# Patient Record
Sex: FEMALE | Race: WHITE | URBAN - METROPOLITAN AREA
[De-identification: names, ages, dates, MRNs, and addresses within clinical notes are randomized per-mention and may not be internally consistent; named-entity substitution may affect disease eponyms.]

---

## 2020-12-18 ENCOUNTER — NEW PATIENT (OUTPATIENT)
Dept: URBAN - METROPOLITAN AREA CLINIC 51 | Facility: CLINIC | Age: 70
End: 2020-12-18
Payer: COMMERCIAL

## 2020-12-18 DIAGNOSIS — H40.013 OPEN ANGLE WITH BORDERLINE FINDINGS, LOW RISK, BILATERAL: Primary | ICD-10-CM

## 2020-12-18 DIAGNOSIS — H04.123 TEAR FILM INSUFFICIENCY OF BILATERAL LACRIMAL GLANDS: ICD-10-CM

## 2020-12-18 PROCEDURE — 92134 CPTRZ OPH DX IMG PST SGM RTA: CPT | Performed by: OPHTHALMOLOGY

## 2020-12-18 PROCEDURE — 92250 FUNDUS PHOTOGRAPHY W/I&R: CPT | Performed by: OPHTHALMOLOGY

## 2020-12-18 PROCEDURE — 92004 COMPRE OPH EXAM NEW PT 1/>: CPT | Performed by: OPHTHALMOLOGY

## 2020-12-18 ASSESSMENT — VISUAL ACUITY
OD: 20/125
OS: 20/30

## 2020-12-18 ASSESSMENT — KERATOMETRY
OD: 44.75
OS: 44.84

## 2020-12-18 ASSESSMENT — INTRAOCULAR PRESSURE
OS: 22
OD: 22

## 2021-02-11 ENCOUNTER — FOLLOW UP ESTABLISHED (OUTPATIENT)
Dept: URBAN - METROPOLITAN AREA CLINIC 44 | Facility: CLINIC | Age: 71
End: 2021-02-11
Payer: COMMERCIAL

## 2021-02-11 DIAGNOSIS — H25.13 AGE-RELATED NUCLEAR CATARACT, BILATERAL: ICD-10-CM

## 2021-02-11 DIAGNOSIS — H10.45 OTHER CHRONIC ALLERGIC CONJUNCTIVITIS: ICD-10-CM

## 2021-02-11 DIAGNOSIS — H43.393 OTHER VITREOUS OPACITIES, BILATERAL: ICD-10-CM

## 2021-02-11 DIAGNOSIS — H40.051 OCULAR HYPERTENSION, RIGHT EYE: ICD-10-CM

## 2021-02-11 PROCEDURE — 99213 OFFICE O/P EST LOW 20 MIN: CPT | Performed by: OPHTHALMOLOGY

## 2021-02-11 PROCEDURE — 76514 ECHO EXAM OF EYE THICKNESS: CPT | Performed by: OPHTHALMOLOGY

## 2021-02-11 PROCEDURE — 92083 EXTENDED VISUAL FIELD XM: CPT | Performed by: OPHTHALMOLOGY

## 2021-02-11 PROCEDURE — 92020 GONIOSCOPY: CPT | Performed by: OPHTHALMOLOGY

## 2021-02-11 PROCEDURE — 92133 CPTRZD OPH DX IMG PST SGM ON: CPT | Performed by: OPHTHALMOLOGY

## 2021-02-11 ASSESSMENT — INTRAOCULAR PRESSURE
OS: 21
OD: 24

## 2021-02-11 ASSESSMENT — VISUAL ACUITY
OS: 20/30
OD: 20/125

## 2021-05-24 ENCOUNTER — OFFICE VISIT (OUTPATIENT)
Dept: URBAN - METROPOLITAN AREA CLINIC 44 | Facility: CLINIC | Age: 71
End: 2021-05-24
Payer: COMMERCIAL

## 2021-05-24 DIAGNOSIS — H40.053 OCULAR HYPERTENSION, BILATERAL: ICD-10-CM

## 2021-05-24 PROCEDURE — 99213 OFFICE O/P EST LOW 20 MIN: CPT | Performed by: OPHTHALMOLOGY

## 2021-05-24 ASSESSMENT — INTRAOCULAR PRESSURE
OS: 24
OD: 24

## 2021-05-24 NOTE — IMPRESSION/PLAN
Impression: Age-related nuclear cataract, bilateral Plan: Review H40.051. Patient may schedule CE/Phaco/IOL/KDB OU Patient is candidate/interested in toric lens/astigmatism correction, standard lens, ORA. Aim OD: -0.25. Aim OS: -0.25. No LenSx/MF/Trimoxi. 
See [address] to Schedule to Sx OD Then OS; H&P, AScan, Dilated Pre-Op 1st Eye; POD1; POW1; 2nd Eye; POD1; POW1; JRM9 [[facility] 1WPOs with Dr Tonia Antonio, 1DPO and 1MPO with Optometry]
- ***Clearance for Cardio Myopathy

## 2021-08-12 ENCOUNTER — OFFICE VISIT (OUTPATIENT)
Dept: URBAN - METROPOLITAN AREA CLINIC 44 | Facility: CLINIC | Age: 71
End: 2021-08-12
Payer: COMMERCIAL

## 2021-08-12 DIAGNOSIS — H25.813 COMBINED FORMS OF AGE-RELATED CATARACT, BILATERAL: ICD-10-CM

## 2021-08-12 DIAGNOSIS — H43.813 VITREOUS DEGENERATION, BILATERAL: ICD-10-CM

## 2021-08-12 DIAGNOSIS — H40.023 OPEN ANGLE WITH BORDERLINE FINDINGS, HIGH RISK, BILATERAL: ICD-10-CM

## 2021-08-12 PROCEDURE — 99214 OFFICE O/P EST MOD 30 MIN: CPT | Performed by: OPHTHALMOLOGY

## 2021-08-12 PROCEDURE — 92083 EXTENDED VISUAL FIELD XM: CPT | Performed by: OPHTHALMOLOGY

## 2021-08-12 ASSESSMENT — INTRAOCULAR PRESSURE
OD: 22
OS: 21

## 2021-08-12 ASSESSMENT — VISUAL ACUITY
OD: 20/60
OS: 20/30

## 2021-08-12 NOTE — IMPRESSION/PLAN
Impression: Vitreous degeneration, bilateral: H43.813. Plan: Monitor. Patient to call/come in with and RD symptoms.

## 2021-08-12 NOTE — IMPRESSION/PLAN
Impression: Combined forms of age-related cataract, bilateral: H25.813. Plan: Discussed cataracts with patient. Surgical treatment is recommended. Surgical options, risks and benefits discussed. Patient elects surgical treatment. Recommend surgery OU, OD first.  Patient is candidate/interested in toric lens/astigmatism correction, standard lens, ORA. Aim OD: -0.25. Aim OS: -0.25. No LenSx/MF/Trimoxi. See [facility] to Schedule CE/Phaco/IOL/KDB OD Then OS; H&P, AScan, UN-Dilated Pre-Op 1st Eye; POD1; POW1; 2nd Eye; POD1; POW1; RWK7 [[facility] 1WPOs with Dr Gume Omer, 1DPO and 1MPO with Optometry] ***Clearance- cardiology/aspirin***

## 2021-08-12 NOTE — IMPRESSION/PLAN
Impression: Open angle with borderline findings, high risk, bilateral: H40.023. Plan: Monitor.  See H40.056

## 2021-09-03 ENCOUNTER — TESTING ONLY (OUTPATIENT)
Dept: URBAN - METROPOLITAN AREA CLINIC 44 | Facility: CLINIC | Age: 71
End: 2021-09-03
Payer: COMMERCIAL

## 2021-09-03 PROCEDURE — 92136 OPHTHALMIC BIOMETRY: CPT | Performed by: OPHTHALMOLOGY

## 2021-09-03 PROCEDURE — 92025 CPTRIZED CORNEAL TOPOGRAPHY: CPT | Performed by: OPHTHALMOLOGY

## 2021-09-03 ASSESSMENT — PACHYMETRY
OS: 2.89
OD: 22.93
OS: 22.70
OD: 3.04

## 2021-09-10 ENCOUNTER — PRE-OPERATIVE VISIT (OUTPATIENT)
Dept: URBAN - METROPOLITAN AREA CLINIC 51 | Facility: CLINIC | Age: 71
End: 2021-09-10
Payer: COMMERCIAL

## 2021-09-10 DIAGNOSIS — H52.223 REGULAR ASTIGMATISM, BILATERAL: ICD-10-CM

## 2021-09-10 DIAGNOSIS — H25.812 COMBINED FORMS OF AGE-RELATED CATARACT, LEFT EYE: ICD-10-CM

## 2021-09-10 DIAGNOSIS — H25.811 COMBINED FORMS OF AGE-RELATED CATARACT, RIGHT EYE: ICD-10-CM

## 2021-09-10 PROCEDURE — 99213 OFFICE O/P EST LOW 20 MIN: CPT | Performed by: OPHTHALMOLOGY

## 2021-09-10 RX ORDER — OFLOXACIN 3 MG/ML
0.3 % SOLUTION/ DROPS OPHTHALMIC
Qty: 5 | Refills: 1 | Status: ACTIVE
Start: 2021-09-10

## 2021-09-10 RX ORDER — KETOROLAC TROMETHAMINE 5 MG/ML
0.5 % SOLUTION OPHTHALMIC
Qty: 10 | Refills: 1 | Status: ACTIVE
Start: 2021-09-10

## 2021-09-10 RX ORDER — PREDNISOLONE ACETATE 10 MG/ML
1 % SUSPENSION/ DROPS OPHTHALMIC
Qty: 10 | Refills: 1 | Status: ACTIVE
Start: 2021-09-10

## 2021-09-10 ASSESSMENT — INTRAOCULAR PRESSURE
OD: 24
OS: 24

## 2021-09-10 NOTE — IMPRESSION/PLAN
Impression: Tear film insufficiency of bilateral lacrimal glands Plan: Patient understands condition may limit possible outcome of surgery.

## 2021-09-10 NOTE — IMPRESSION/PLAN
Impression: Vitreous degeneration, bilateral: H43.813. Plan: Monitor. Patient understands condition may limit possible outcome of surgery.

## 2021-09-10 NOTE — IMPRESSION/PLAN
Impression: Other chronic allergic conjunctivitis ; OU Plan: Continue allergy drop. Patient understands condition may limit possible outcome of surgery.

## 2021-09-10 NOTE — IMPRESSION/PLAN
Impression: Open angle with borderline findings, high risk, bilateral: H40.023. Plan: Monitor. See H40.053 Patient understands condition may limit possible outcome of surgery.

## 2021-09-10 NOTE — IMPRESSION/PLAN
Impression: Ocular hypertension, bilateral: H40.053. Plan: Recommend to proceed with Phaco/KDB OU. Patient understands condition may limit possible outcome of surgery.

## 2021-09-17 ENCOUNTER — ADULT PHYSICAL (OUTPATIENT)
Dept: URBAN - METROPOLITAN AREA CLINIC 44 | Facility: CLINIC | Age: 71
End: 2021-09-17
Payer: COMMERCIAL

## 2021-09-17 DIAGNOSIS — Z01.818 ENCOUNTER FOR OTHER PREPROCEDURAL EXAMINATION: Primary | ICD-10-CM

## 2021-09-17 PROCEDURE — 99203 OFFICE O/P NEW LOW 30 MIN: CPT | Performed by: PHYSICIAN ASSISTANT

## 2021-09-17 RX ORDER — FUROSEMIDE 40 MG/1
40 MG TABLET ORAL
Qty: 0 | Refills: 0 | Status: ACTIVE
Start: 2021-09-17

## 2021-09-17 RX ORDER — SPIRONOLACTONE 50 MG/1
50 MG TABLET, FILM COATED ORAL
Qty: 0 | Refills: 0 | Status: ACTIVE
Start: 2021-09-17

## 2021-09-23 ENCOUNTER — SURGERY (OUTPATIENT)
Dept: URBAN - METROPOLITAN AREA SURGERY 19 | Facility: SURGERY | Age: 71
End: 2021-09-23
Payer: COMMERCIAL

## 2021-09-24 ENCOUNTER — POST-OPERATIVE VISIT (OUTPATIENT)
Dept: URBAN - METROPOLITAN AREA CLINIC 44 | Facility: CLINIC | Age: 71
End: 2021-09-24

## 2021-09-24 PROCEDURE — 99024 POSTOP FOLLOW-UP VISIT: CPT | Performed by: OPTOMETRIST

## 2021-09-24 ASSESSMENT — INTRAOCULAR PRESSURE: OD: 18

## 2021-09-24 NOTE — IMPRESSION/PLAN
Impression: S/P Cataract Extraction by phacoemulsification with IOL placement; LRI (Limbal Relaxing Incision); Goniotomy OD - 1 Day. Encounter for surgical aftercare following surgery on a sense organ  Z48.810. Plan: Post-op instructions reviewed.

## 2021-10-01 ENCOUNTER — POST-OPERATIVE VISIT (OUTPATIENT)
Dept: URBAN - METROPOLITAN AREA CLINIC 51 | Facility: CLINIC | Age: 71
End: 2021-10-01
Payer: COMMERCIAL

## 2021-10-01 DIAGNOSIS — Z48.810 ENCOUNTER FOR SURGICAL AFTERCARE FOLLOWING SURGERY ON A SENSE ORGAN: Primary | ICD-10-CM

## 2021-10-01 DIAGNOSIS — H40.012 OPEN ANGLE WITH BORDERLINE FINDINGS, LOW RISK, LEFT EYE: ICD-10-CM

## 2021-10-01 DIAGNOSIS — H25.12 AGE-RELATED NUCLEAR CATARACT, LEFT EYE: ICD-10-CM

## 2021-10-01 PROCEDURE — 99024 POSTOP FOLLOW-UP VISIT: CPT | Performed by: OPHTHALMOLOGY

## 2021-10-01 ASSESSMENT — INTRAOCULAR PRESSURE
OD: 19
OS: 19

## 2021-10-01 ASSESSMENT — VISUAL ACUITY
OS: 20/30
OD: 20/60

## 2021-10-01 NOTE — IMPRESSION/PLAN
Impression: Age-related nuclear cataract, left eye: H25.12. Plan: The patient wishes to proceed with surgery. See [facility] to schedule CE/Phaco/IOL/[place] Dual Blade Goniotomy OS 2nd Eye, [[AIM: -0.25; Standard Lens; YES LRI (LRI @5 ARC [de-identified]) ; No Trimoxi/Dexycu; Use Oflox/Prednisolone/Ket QID OS ]] BSS+ 
-***Clearance not needed. -ERx'd Ofloxacin QID OS Prednisolone QID OS, and Ketorolac QID OS as requested.

## 2021-10-01 NOTE — IMPRESSION/PLAN
Impression: Open angle with borderline findings, low risk, left eye: H40.012. Plan: Discussed cataract and glaucoma diagnosis in detail with patient. Phaco/IOL, [place] Dual Blade Goniotomy surgery was discussed in detail. Discussed risks, benefits, and expectations of cataract surgery. Patient also understands glasses will be necessary for sharpest vision after surgery. Although surgery is expected to improve the condition, the patient understands that there is a chance that the condition could worsen in the future. Patient advised that ongoing uncontrolled glaucoma may result in permanent vision loss. Discussed surgery in detail with patient. Post-Operative instructions reviewed with patient. Discussed activity restrictions including no bending head below waist, rubbing the eye, straining or lifting over 10 lbs, stay out of mia, dirty areas and shield at night for one week. All questions answered.

## 2021-10-01 NOTE — IMPRESSION/PLAN
Impression: S/P Cataract Extraction by phacoemulsification with IOL placement; LRI (Limbal Relaxing Incision); Goniotomy OD - 1 Day. Encounter for surgical aftercare following surgery on a sense organ  Z48.810. Plan: POW1 Phaco/IOL/KDB OD. Discussed diagnosis and treatment options in detail. Patient may proceed with second eye cataract surgery based on complaint today. Surgical risks and complications were reviewed today and all questions were answered. Patient reminded of need for glasses for best vision after cataract surgery. Recommend patient continue not rubbing surgical eye for 1 month, no swimming or eye makeup for a total of two weeks. Patient wishes to proceed with surgery of the second eye.
-Drop schedule: Stop Ofloxacin. Continue Ketorolac QID OD [id number] from surgery. Begin taper of Prednisolone OD to TID x1week then BID x1week then QD x1week then stop.

## 2021-10-01 NOTE — IMPRESSION/PLAN
Impression: Tear film insufficiency of bilateral lacrimal glands: H04.123. Plan: Patient understands condition may limit possible outcome of surgery.

## 2021-10-07 ENCOUNTER — SURGERY (OUTPATIENT)
Dept: URBAN - METROPOLITAN AREA SURGERY 19 | Facility: SURGERY | Age: 71
End: 2021-10-07
Payer: COMMERCIAL

## 2021-10-08 ENCOUNTER — POST-OPERATIVE VISIT (OUTPATIENT)
Dept: URBAN - METROPOLITAN AREA CLINIC 44 | Facility: CLINIC | Age: 71
End: 2021-10-08

## 2021-10-08 PROCEDURE — 99024 POSTOP FOLLOW-UP VISIT: CPT | Performed by: OPTOMETRIST

## 2021-10-08 ASSESSMENT — INTRAOCULAR PRESSURE: OS: 14

## 2021-10-08 NOTE — IMPRESSION/PLAN
Impression: S/P Cataract Extraction by phacoemulsification with IOL placement; LRI (Limbal Relaxing Incision); Goniotomy OS - 1 Day. Encounter for surgical aftercare following surgery on a sense organ  Z48.810. Plan: Post-op instructions reviewed.

## 2021-10-15 ENCOUNTER — POST-OPERATIVE VISIT (OUTPATIENT)
Dept: URBAN - METROPOLITAN AREA CLINIC 51 | Facility: CLINIC | Age: 71
End: 2021-10-15
Payer: COMMERCIAL

## 2021-10-15 PROCEDURE — 99024 POSTOP FOLLOW-UP VISIT: CPT | Performed by: OPHTHALMOLOGY

## 2021-10-15 ASSESSMENT — INTRAOCULAR PRESSURE
OS: 17
OD: 21

## 2021-10-15 NOTE — IMPRESSION/PLAN
Impression: S/P Cataract Extraction by phacoemulsification with IOL placement; LRI (Limbal Relaxing Incision); Goniotomy OS - 8 Days. Presence of intraocular lens  Z96.1. Plan: Advised patient that eye is healing well. Discontinue activity restrictions and shield use. Patient to continue not rubbing surgical eye for 1 month. Drop instructions: Stop Ofloxacin. Continue Ketorolac QID OS [id number] from surgery. Begin taper of Prednisolone OS to TID x1week then BID x1week then QD x1week then stop. RTC 3 weeks for Refraction [Distance], workup tech to check IOPs and dilate OU. 
Then follow in general clinic for glaucoma testing

## 2021-11-04 ENCOUNTER — POST-OPERATIVE VISIT (OUTPATIENT)
Dept: URBAN - METROPOLITAN AREA CLINIC 44 | Facility: CLINIC | Age: 71
End: 2021-11-04
Payer: COMMERCIAL

## 2021-11-04 DIAGNOSIS — Z96.1 PRESENCE OF INTRAOCULAR LENS: Primary | ICD-10-CM

## 2021-11-04 PROCEDURE — 99024 POSTOP FOLLOW-UP VISIT: CPT | Performed by: OPTOMETRIST

## 2021-11-04 ASSESSMENT — INTRAOCULAR PRESSURE
OS: 13
OD: 14

## 2021-11-04 ASSESSMENT — VISUAL ACUITY
OS: 20/25
OD: 20/25

## 2021-11-04 NOTE — IMPRESSION/PLAN
Impression: S/P Cataract Extraction by phacoemulsification with IOL placement; LRI (Limbal Relaxing Incision); Goniotomy OS - 28 Days. Presence of intraocular lens  Z96.1. Plan: Healing well. Release new spec rx.

## 2023-05-05 ENCOUNTER — OFFICE VISIT (OUTPATIENT)
Dept: URBAN - METROPOLITAN AREA CLINIC 10 | Facility: CLINIC | Age: 73
End: 2023-05-05

## 2023-05-05 DIAGNOSIS — H04.203 BILATERAL EPIPHORA: Primary | ICD-10-CM

## 2023-05-05 DIAGNOSIS — H04.553 ACQUIRED STENOSIS OF BILATERAL NASOLACRIMAL DUCT: ICD-10-CM

## 2023-05-05 PROCEDURE — 68801 DILATE TEAR DUCT OPENING: CPT | Performed by: STUDENT IN AN ORGANIZED HEALTH CARE EDUCATION/TRAINING PROGRAM

## 2023-05-05 PROCEDURE — 92285 EXTERNAL OCULAR PHOTOGRAPHY: CPT | Performed by: STUDENT IN AN ORGANIZED HEALTH CARE EDUCATION/TRAINING PROGRAM

## 2023-05-05 PROCEDURE — 99214 OFFICE O/P EST MOD 30 MIN: CPT | Performed by: STUDENT IN AN ORGANIZED HEALTH CARE EDUCATION/TRAINING PROGRAM

## 2023-05-05 RX ORDER — FLUOROMETHOLONE ACETATE 1 MG/ML
0.1 % SUSPENSION/ DROPS OPHTHALMIC
Qty: 1 | Refills: 0 | Status: ACTIVE
Start: 2023-05-05

## 2023-05-05 NOTE — IMPRESSION/PLAN
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 Plan: Irrigation reveals partial NLDO OU (OS>OD) Patient notes tearing x 15 years, no bloody tears, no lacrimal sac dilation/mass, no h/o sinus surgery or facial trauma. Trial of FML eye drops. Return 1 month to discuss possible DCR.

## 2023-05-05 NOTE — IMPRESSION/PLAN
Impression: Bilateral epiphora: H04.203. Plan: Irrigated BLL puncta, Right puncta Reflux 60%, NL Outflow 40%, Left puncta Reflux 80%, NL Outflow 20%. See plan. Partial NLDO OU (OS>OD) Tearing multifactorial secondary to partial NLDO, dry eye/reflex tearing, and possible component of edematous/enlarged caruncle occluding punctum. Recommend FML drops 2 times per day for 2 weeks for partial NLDO. Do not use FML for longer than 2 weeks due to risk of IOP elevation. Return 1 month. Discussed possible DCR versus caruncle debulking in the future.

## 2023-06-21 ENCOUNTER — OFFICE VISIT (OUTPATIENT)
Dept: URBAN - METROPOLITAN AREA CLINIC 56 | Facility: LOCATION | Age: 73
End: 2023-06-21
Payer: COMMERCIAL

## 2023-06-21 DIAGNOSIS — H04.203 BILATERAL EPIPHORA: ICD-10-CM

## 2023-06-21 DIAGNOSIS — H04.553 ACQUIRED STENOSIS OF BILATERAL NASOLACRIMAL DUCT: Primary | ICD-10-CM

## 2023-06-21 PROCEDURE — 99213 OFFICE O/P EST LOW 20 MIN: CPT | Performed by: STUDENT IN AN ORGANIZED HEALTH CARE EDUCATION/TRAINING PROGRAM

## 2023-06-21 NOTE — IMPRESSION/PLAN
Impression: Bilateral epiphora: H04.203. Plan: Irrigated BLL puncta, Right puncta Reflux 80%, NL Outflow 20%, Left puncta Reflux 100%, NL Outflow 0%. See plan. No improvement with topical steroid drops. Patient wishes to proceed with DCR surgery.

## 2023-06-21 NOTE — IMPRESSION/PLAN
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 Plan: Irrigation reveals partial NLDO OU (OS>OD). Patient notes tearing x 15 years, no bloody tears, no lacrimal sac dilation/mass, no h/o sinus surgery or facial trauma. No improvement with FML drops. Examination and irrigation of the lacrimal system revealed an obstruction of the pt's nasolacrimal duct. I recommend the pt undergo a dacryocystorhinostomy/ Anterior Ethmoidectomy with Lacrimal Sac Biopsy and placement of lacrimal stent to resolve the tearing problem, as well as prevent the likelihood of dacryocystitis in the future. We discussed some of the technical aspects of the procedure, as well as the r/b/a's. Will schedule left eye first, right eye to follow approx 1 month later.

## 2023-08-03 ENCOUNTER — POST-OPERATIVE VISIT (OUTPATIENT)
Dept: URBAN - METROPOLITAN AREA CLINIC 56 | Facility: LOCATION | Age: 73
End: 2023-08-03
Payer: COMMERCIAL

## 2023-08-03 DIAGNOSIS — Z48.89 ENCOUNTER FOR OTHER SPECIFIED SURGICAL AFTERCARE: Primary | ICD-10-CM

## 2023-08-03 PROCEDURE — 99024 POSTOP FOLLOW-UP VISIT: CPT | Performed by: STUDENT IN AN ORGANIZED HEALTH CARE EDUCATION/TRAINING PROGRAM

## 2023-08-31 ENCOUNTER — POST-OPERATIVE VISIT (OUTPATIENT)
Dept: URBAN - METROPOLITAN AREA CLINIC 56 | Facility: LOCATION | Age: 73
End: 2023-08-31
Payer: COMMERCIAL

## 2023-08-31 DIAGNOSIS — Z48.89 ENCOUNTER FOR OTHER SPECIFIED SURGICAL AFTERCARE: Primary | ICD-10-CM

## 2023-08-31 PROCEDURE — 99024 POSTOP FOLLOW-UP VISIT: CPT | Performed by: STUDENT IN AN ORGANIZED HEALTH CARE EDUCATION/TRAINING PROGRAM

## 2023-11-14 ENCOUNTER — OFFICE VISIT (OUTPATIENT)
Dept: URBAN - METROPOLITAN AREA CLINIC 43 | Facility: CLINIC | Age: 73
End: 2023-11-14
Payer: COMMERCIAL

## 2023-11-14 DIAGNOSIS — Z48.89 ENCOUNTER FOR OTHER SPECIFIED SURGICAL AFTERCARE: Primary | ICD-10-CM

## 2023-11-14 PROCEDURE — 99214 OFFICE O/P EST MOD 30 MIN: CPT | Performed by: STUDENT IN AN ORGANIZED HEALTH CARE EDUCATION/TRAINING PROGRAM

## 2025-04-10 ENCOUNTER — OFFICE VISIT (OUTPATIENT)
Dept: URBAN - METROPOLITAN AREA CLINIC 51 | Facility: CLINIC | Age: 75
End: 2025-04-10
Payer: COMMERCIAL

## 2025-04-10 DIAGNOSIS — H43.813 VITREOUS DEGENERATION, BILATERAL: ICD-10-CM

## 2025-04-10 DIAGNOSIS — H40.023 OPEN ANGLE WITH BORDERLINE FINDINGS, HIGH RISK, BILATERAL: ICD-10-CM

## 2025-04-10 DIAGNOSIS — H26.493 OTHER SECONDARY CATARACT, BILATERAL: Primary | ICD-10-CM

## 2025-04-10 DIAGNOSIS — H52.4 PRESBYOPIA: ICD-10-CM

## 2025-04-10 DIAGNOSIS — R73.09 OTHER ABNORMAL GLUCOSE: ICD-10-CM

## 2025-04-10 DIAGNOSIS — H40.013 OPEN ANGLE WITH BORDERLINE FINDINGS, LOW RISK, BILATERAL: ICD-10-CM

## 2025-04-10 DIAGNOSIS — H10.45 OTHER CHRONIC ALLERGIC CONJUNCTIVITIS: ICD-10-CM

## 2025-04-10 PROCEDURE — 92134 CPTRZ OPH DX IMG PST SGM RTA: CPT | Performed by: OPTOMETRIST

## 2025-04-10 PROCEDURE — 92133 CPTRZD OPH DX IMG PST SGM ON: CPT | Performed by: OPTOMETRIST

## 2025-04-10 PROCEDURE — 99204 OFFICE O/P NEW MOD 45 MIN: CPT | Performed by: OPTOMETRIST

## 2025-04-10 PROCEDURE — 99214 OFFICE O/P EST MOD 30 MIN: CPT | Performed by: OPTOMETRIST

## 2025-04-10 ASSESSMENT — INTRAOCULAR PRESSURE
OD: 17
OS: 16

## 2025-04-10 ASSESSMENT — VISUAL ACUITY
OD: 20/100
OS: 20/25

## 2025-04-10 ASSESSMENT — KERATOMETRY
OS: 44.38
OD: 44.25

## 2025-04-29 ENCOUNTER — SURGERY (OUTPATIENT)
Dept: URBAN - METROPOLITAN AREA SURGERY 19 | Facility: SURGERY | Age: 75
End: 2025-04-29
Payer: COMMERCIAL

## 2025-04-29 PROCEDURE — 66821 AFTER CATARACT LASER SURGERY: CPT | Performed by: OPHTHALMOLOGY

## 2025-05-06 ENCOUNTER — TECH ONLY (OUTPATIENT)
Dept: URBAN - METROPOLITAN AREA CLINIC 51 | Facility: CLINIC | Age: 75
End: 2025-05-06
Payer: COMMERCIAL

## 2025-05-06 DIAGNOSIS — H40.013 OPEN ANGLE WITH BORDERLINE FINDINGS, LOW RISK, BILATERAL: Primary | ICD-10-CM

## 2025-05-06 PROCEDURE — 92083 EXTENDED VISUAL FIELD XM: CPT | Performed by: OPTOMETRIST

## 2025-05-13 ENCOUNTER — SURGERY (OUTPATIENT)
Dept: URBAN - METROPOLITAN AREA SURGERY 19 | Facility: SURGERY | Age: 75
End: 2025-05-13
Payer: COMMERCIAL

## 2025-05-13 PROCEDURE — 66821 AFTER CATARACT LASER SURGERY: CPT | Performed by: OPHTHALMOLOGY
